# Patient Record
Sex: MALE | Race: BLACK OR AFRICAN AMERICAN | Employment: FULL TIME | URBAN - METROPOLITAN AREA
[De-identification: names, ages, dates, MRNs, and addresses within clinical notes are randomized per-mention and may not be internally consistent; named-entity substitution may affect disease eponyms.]

---

## 2017-06-05 ENCOUNTER — OFFICE VISIT (OUTPATIENT)
Dept: ORTHOPEDIC SURGERY | Age: 52
End: 2017-06-05

## 2017-06-05 VITALS — BODY MASS INDEX: 24.52 KG/M2 | WEIGHT: 185 LBS | HEIGHT: 73 IN

## 2017-06-05 DIAGNOSIS — M17.11 PRIMARY OSTEOARTHRITIS OF RIGHT KNEE: Primary | ICD-10-CM

## 2017-06-05 DIAGNOSIS — M25.561 CHRONIC PAIN OF RIGHT KNEE: ICD-10-CM

## 2017-06-05 DIAGNOSIS — G89.29 CHRONIC PAIN OF RIGHT KNEE: ICD-10-CM

## 2017-06-05 PROCEDURE — 20610 DRAIN/INJ JOINT/BURSA W/O US: CPT | Performed by: ORTHOPAEDIC SURGERY

## 2017-06-05 PROCEDURE — 99243 OFF/OP CNSLTJ NEW/EST LOW 30: CPT | Performed by: ORTHOPAEDIC SURGERY

## 2017-06-05 RX ORDER — MELOXICAM 15 MG/1
15 TABLET ORAL DAILY
Qty: 30 TABLET | Refills: 1 | Status: SHIPPED | OUTPATIENT
Start: 2017-06-05 | End: 2017-07-17

## 2017-07-17 ENCOUNTER — OFFICE VISIT (OUTPATIENT)
Dept: INTERNAL MEDICINE CLINIC | Age: 52
End: 2017-07-17

## 2017-07-17 ENCOUNTER — OFFICE VISIT (OUTPATIENT)
Dept: ORTHOPEDIC SURGERY | Age: 52
End: 2017-07-17

## 2017-07-17 VITALS
HEART RATE: 66 BPM | RESPIRATION RATE: 16 BRPM | WEIGHT: 188.4 LBS | OXYGEN SATURATION: 98 % | TEMPERATURE: 99 F | DIASTOLIC BLOOD PRESSURE: 100 MMHG | SYSTOLIC BLOOD PRESSURE: 159 MMHG | HEIGHT: 72 IN | BODY MASS INDEX: 25.52 KG/M2

## 2017-07-17 VITALS
BODY MASS INDEX: 25.05 KG/M2 | SYSTOLIC BLOOD PRESSURE: 145 MMHG | HEART RATE: 68 BPM | HEIGHT: 73 IN | DIASTOLIC BLOOD PRESSURE: 99 MMHG | WEIGHT: 189 LBS

## 2017-07-17 DIAGNOSIS — M17.11 PRIMARY OSTEOARTHRITIS OF RIGHT KNEE: Primary | ICD-10-CM

## 2017-07-17 DIAGNOSIS — R03.0 ELEVATED BLOOD PRESSURE READING: ICD-10-CM

## 2017-07-17 DIAGNOSIS — Z13.0 SCREENING FOR DEFICIENCY ANEMIA: ICD-10-CM

## 2017-07-17 DIAGNOSIS — H53.9 VISION CHANGES: Primary | ICD-10-CM

## 2017-07-17 DIAGNOSIS — Z76.89 ENCOUNTER TO ESTABLISH CARE: ICD-10-CM

## 2017-07-17 DIAGNOSIS — Z11.4 SCREENING FOR HIV (HUMAN IMMUNODEFICIENCY VIRUS): ICD-10-CM

## 2017-07-17 DIAGNOSIS — Z13.220 SCREENING FOR HYPERLIPIDEMIA: ICD-10-CM

## 2017-07-17 DIAGNOSIS — M17.11 PRIMARY OSTEOARTHRITIS OF RIGHT KNEE: ICD-10-CM

## 2017-07-17 DIAGNOSIS — Z13.1 SCREENING FOR DIABETES MELLITUS (DM): ICD-10-CM

## 2017-07-17 DIAGNOSIS — R56.9 SEIZURE (HCC): ICD-10-CM

## 2017-07-17 LAB
A/G RATIO: 1.1 (ref 1.1–2.2)
ALBUMIN SERPL-MCNC: 4.8 G/DL (ref 3.4–5)
ALP BLD-CCNC: 38 U/L (ref 40–129)
ALT SERPL-CCNC: 78 U/L (ref 10–40)
ANION GAP SERPL CALCULATED.3IONS-SCNC: 15 MMOL/L (ref 3–16)
AST SERPL-CCNC: 85 U/L (ref 15–37)
BASOPHILS ABSOLUTE: 0 K/UL (ref 0–0.2)
BASOPHILS RELATIVE PERCENT: 0.2 %
BILIRUB SERPL-MCNC: 0.3 MG/DL (ref 0–1)
BUN BLDV-MCNC: 12 MG/DL (ref 7–20)
CALCIUM SERPL-MCNC: 9.9 MG/DL (ref 8.3–10.6)
CHLORIDE BLD-SCNC: 101 MMOL/L (ref 99–110)
CHOLESTEROL, FASTING: 201 MG/DL (ref 0–199)
CO2: 24 MMOL/L (ref 21–32)
CREAT SERPL-MCNC: 0.7 MG/DL (ref 0.9–1.3)
EOSINOPHILS ABSOLUTE: 0 K/UL (ref 0–0.6)
EOSINOPHILS RELATIVE PERCENT: 0.5 %
GFR AFRICAN AMERICAN: >60
GFR NON-AFRICAN AMERICAN: >60
GLOBULIN: 4.4 G/DL
GLUCOSE BLD-MCNC: 90 MG/DL (ref 70–99)
HCT VFR BLD CALC: 41.5 % (ref 40.5–52.5)
HDLC SERPL-MCNC: 69 MG/DL (ref 40–60)
HEMOGLOBIN: 13.5 G/DL (ref 13.5–17.5)
LDL CHOLESTEROL CALCULATED: 114 MG/DL
LYMPHOCYTES ABSOLUTE: 0.7 K/UL (ref 1–5.1)
LYMPHOCYTES RELATIVE PERCENT: 15 %
MCH RBC QN AUTO: 31.4 PG (ref 26–34)
MCHC RBC AUTO-ENTMCNC: 32.5 G/DL (ref 31–36)
MCV RBC AUTO: 96.5 FL (ref 80–100)
MONOCYTES ABSOLUTE: 0.8 K/UL (ref 0–1.3)
MONOCYTES RELATIVE PERCENT: 16.1 %
NEUTROPHILS ABSOLUTE: 3.3 K/UL (ref 1.7–7.7)
NEUTROPHILS RELATIVE PERCENT: 68.2 %
PDW BLD-RTO: 14.3 % (ref 12.4–15.4)
PLATELET # BLD: 102 K/UL (ref 135–450)
PMV BLD AUTO: 8.7 FL (ref 5–10.5)
POTASSIUM SERPL-SCNC: 4.9 MMOL/L (ref 3.5–5.1)
RBC # BLD: 4.3 M/UL (ref 4.2–5.9)
SODIUM BLD-SCNC: 140 MMOL/L (ref 136–145)
TOTAL PROTEIN: 9.2 G/DL (ref 6.4–8.2)
TRIGLYCERIDE, FASTING: 89 MG/DL (ref 0–150)
VLDLC SERPL CALC-MCNC: 18 MG/DL
WBC # BLD: 4.8 K/UL (ref 4–11)

## 2017-07-17 PROCEDURE — 99213 OFFICE O/P EST LOW 20 MIN: CPT | Performed by: ORTHOPAEDIC SURGERY

## 2017-07-17 PROCEDURE — 99204 OFFICE O/P NEW MOD 45 MIN: CPT | Performed by: NURSE PRACTITIONER

## 2017-07-17 RX ORDER — LEVETIRACETAM 1000 MG/1
1000 TABLET ORAL 2 TIMES DAILY
COMMUNITY
Start: 2017-04-21

## 2017-07-17 RX ORDER — IBUPROFEN 600 MG/1
600 TABLET ORAL EVERY 8 HOURS PRN
Qty: 60 TABLET | Refills: 1 | Status: SHIPPED | OUTPATIENT
Start: 2017-07-17

## 2017-07-17 RX ORDER — MELOXICAM 15 MG/1
15 TABLET ORAL DAILY
Qty: 30 TABLET | Refills: 1 | Status: SHIPPED | OUTPATIENT
Start: 2017-07-17

## 2017-07-17 RX ORDER — LORATADINE 10 MG/1
10 TABLET ORAL DAILY
COMMUNITY

## 2017-07-17 RX ORDER — IBUPROFEN 600 MG/1
600 TABLET ORAL EVERY 8 HOURS PRN
COMMUNITY
Start: 2016-10-23 | End: 2017-07-17 | Stop reason: SDUPTHER

## 2017-07-17 RX ORDER — LEVETIRACETAM 500 MG/1
1000 TABLET ORAL 2 TIMES DAILY
Qty: 180 TABLET | Refills: 3 | Status: SHIPPED | OUTPATIENT
Start: 2017-07-17

## 2017-07-17 ASSESSMENT — PATIENT HEALTH QUESTIONNAIRE - PHQ9
1. LITTLE INTEREST OR PLEASURE IN DOING THINGS: 0
SUM OF ALL RESPONSES TO PHQ9 QUESTIONS 1 & 2: 0
2. FEELING DOWN, DEPRESSED OR HOPELESS: 0
SUM OF ALL RESPONSES TO PHQ QUESTIONS 1-9: 0

## 2017-07-17 ASSESSMENT — ENCOUNTER SYMPTOMS
CONSTIPATION: 0
VOMITING: 0
RHINORRHEA: 0
NAUSEA: 0
COUGH: 0
ABDOMINAL DISTENTION: 0
DIARRHEA: 0

## 2017-07-18 LAB — HIV-1 AND HIV-2 ANTIBODIES: NORMAL

## 2017-07-26 ENCOUNTER — TELEPHONE (OUTPATIENT)
Dept: ORTHOPEDIC SURGERY | Age: 52
End: 2017-07-26

## 2017-08-02 ENCOUNTER — OFFICE VISIT (OUTPATIENT)
Dept: ORTHOPEDIC SURGERY | Age: 52
End: 2017-08-02

## 2017-08-02 VITALS — HEIGHT: 73 IN | BODY MASS INDEX: 25.05 KG/M2 | WEIGHT: 189 LBS

## 2017-08-02 DIAGNOSIS — M17.11 PRIMARY OSTEOARTHRITIS OF RIGHT KNEE: Primary | ICD-10-CM

## 2017-08-02 PROCEDURE — 20610 DRAIN/INJ JOINT/BURSA W/O US: CPT | Performed by: ORTHOPAEDIC SURGERY

## 2017-08-09 ENCOUNTER — OFFICE VISIT (OUTPATIENT)
Dept: ORTHOPEDIC SURGERY | Age: 52
End: 2017-08-09

## 2017-08-09 VITALS — WEIGHT: 189 LBS | BODY MASS INDEX: 25.05 KG/M2 | RESPIRATION RATE: 12 BRPM | HEIGHT: 73 IN

## 2017-08-09 DIAGNOSIS — M17.11 PRIMARY OSTEOARTHRITIS OF RIGHT KNEE: Primary | ICD-10-CM

## 2017-08-09 PROCEDURE — 20610 DRAIN/INJ JOINT/BURSA W/O US: CPT | Performed by: ORTHOPAEDIC SURGERY

## 2017-08-16 ENCOUNTER — OFFICE VISIT (OUTPATIENT)
Dept: ORTHOPEDIC SURGERY | Age: 52
End: 2017-08-16

## 2017-08-16 VITALS — BODY MASS INDEX: 25.05 KG/M2 | WEIGHT: 189 LBS | HEIGHT: 73 IN

## 2017-08-16 DIAGNOSIS — M17.11 PRIMARY OSTEOARTHRITIS OF RIGHT KNEE: Primary | ICD-10-CM

## 2017-08-16 PROCEDURE — 20610 DRAIN/INJ JOINT/BURSA W/O US: CPT | Performed by: ORTHOPAEDIC SURGERY

## 2017-09-01 ENCOUNTER — OFFICE VISIT (OUTPATIENT)
Dept: INTERNAL MEDICINE CLINIC | Age: 52
End: 2017-09-01

## 2017-09-01 VITALS
DIASTOLIC BLOOD PRESSURE: 85 MMHG | RESPIRATION RATE: 16 BRPM | SYSTOLIC BLOOD PRESSURE: 138 MMHG | BODY MASS INDEX: 25.84 KG/M2 | HEIGHT: 73 IN | HEART RATE: 70 BPM | OXYGEN SATURATION: 98 % | WEIGHT: 195 LBS

## 2017-09-01 DIAGNOSIS — Z23 NEED FOR PROPHYLACTIC VACCINATION AGAINST DIPHTHERIA-TETANUS-PERTUSSIS (DTP): ICD-10-CM

## 2017-09-01 DIAGNOSIS — D3A.8 BENIGN NEUROENDOCRINE TUMOR OF RECTUM: ICD-10-CM

## 2017-09-01 DIAGNOSIS — Z12.11 SCREENING FOR COLON CANCER: ICD-10-CM

## 2017-09-01 DIAGNOSIS — R56.9 SEIZURE (HCC): Primary | ICD-10-CM

## 2017-09-01 PROBLEM — F10.10 ALCOHOL ABUSE: Status: ACTIVE | Noted: 2017-09-01

## 2017-09-01 PROBLEM — Z91.09 ENVIRONMENTAL ALLERGIES: Status: ACTIVE | Noted: 2017-09-01

## 2017-09-01 PROCEDURE — 90715 TDAP VACCINE 7 YRS/> IM: CPT | Performed by: NURSE PRACTITIONER

## 2017-09-01 PROCEDURE — 99214 OFFICE O/P EST MOD 30 MIN: CPT | Performed by: NURSE PRACTITIONER

## 2017-09-01 PROCEDURE — 90471 IMMUNIZATION ADMIN: CPT | Performed by: NURSE PRACTITIONER

## 2017-09-01 ASSESSMENT — ENCOUNTER SYMPTOMS
RHINORRHEA: 0
COUGH: 0
CONSTIPATION: 0
DIARRHEA: 0
NAUSEA: 0

## 2017-10-04 ENCOUNTER — OFFICE VISIT (OUTPATIENT)
Dept: ORTHOPEDIC SURGERY | Age: 52
End: 2017-10-04

## 2017-10-04 VITALS
BODY MASS INDEX: 25.05 KG/M2 | HEART RATE: 80 BPM | SYSTOLIC BLOOD PRESSURE: 112 MMHG | HEIGHT: 73 IN | WEIGHT: 189 LBS | DIASTOLIC BLOOD PRESSURE: 70 MMHG

## 2017-10-04 DIAGNOSIS — Z01.818 PRE-OP TESTING: ICD-10-CM

## 2017-10-04 DIAGNOSIS — M17.11 PRIMARY OSTEOARTHRITIS OF RIGHT KNEE: Primary | ICD-10-CM

## 2017-10-04 PROCEDURE — 99214 OFFICE O/P EST MOD 30 MIN: CPT | Performed by: ORTHOPAEDIC SURGERY

## 2017-10-04 NOTE — MR AVS SNAPSHOT
After Visit Summary             Fili Mediate   10/4/2017 9:15 AM   Office Visit    Description:  Male : 1965   Provider:  Rohith Tan MD   Department:  [address] [facility] Sp[facility]              Your Follow-Up and Future Appointments         Below is a list of your follow-up and future appointments. This may not be a complete list as you may have made appointments directly with providers that we are not aware of or your providers may have made some for you. Please call your providers to confirm appointments. It is important to keep your appointments. Please bring your current insurance card, photo ID, co-pay, and all medication bottles to your appointment. If self-pay, payment is expected at the time of service. Your To-Do List     Future Appointments Provider Department Dept Phone    2018 9:00 AM Rohith Tan MD [facility] [phone]    3/2/2018 9:00 [facility] [address] [place] [address] Internal Medicine and Pediatrics [phone]    Please arrive 15 minutes prior to appointment, bring photo ID and insurance card.     Future Orders Complete By Expires    CBC WITH AUTO DIFFERENTIAL [[id number] Custom]  10/4/2017 10/4/2018         Information from Your Visit        Department     Name Address Phone Fax    [address] [facility] Spine [address] [facility]) [address] [place] [address] 38. [phone]      You Were Seen for:         Comments    Primary osteoarthritis of right knee   [838472]         Vital Signs     Blood Pressure Pulse Height Weight Body Mass Index Smoking Status    112/70 (Site: Left Arm, Position: Sitting, Cuff Size: Medium Adult) 80 6' 1\" (1.854 m) 189 lb (85.7 kg) 24.94 kg/m2 Never Smoker         Medications and Orders      Your Current Medications Are              meloxicam (MOBIC) 15 MG tablet Take 1 tablet by mouth daily 3. Enter your Mlog Access Code exactly as it appears below. You will not need to use this code after youve completed the sign-up process. If you do not sign up before the expiration date, you must request a new code. Mlog Access Code: [email or web address]  Expires: 12/3/2017 10:58 AM    4. Enter your Social Security Number ([id number]) and Date of Birth (mm/dd/yyyy) as indicated and click Submit. You will be taken to the next sign-up page. 5. Create a Mlog ID. This will be your Mlog login ID and cannot be changed, so think of one that is secure and easy to remember. 6. Create a Mlog password. You can change your password at any time. 7. Enter your Password Reset Question and Answer. This can be used at a later time if you forget your password. 8. Enter your e-mail address. You will receive e-mail notification when new information is available in [address] 9. Click Sign Up. You can now view your medical record. Additional Information  If you have questions, please contact the physician practice where you receive care. Remember, Mlog is NOT to be used for urgent needs. For medical emergencies, dial 911. For questions regarding your Mlog account call [phone]. If you have a clinical question, please call your doctor's office.

## 2017-10-04 NOTE — PROGRESS NOTES
brisk cap refill. Patient is able to dorsiflex and wiggle all toes. Deep tendon reflexes of the lower extremities are normal and symmetric. Imagin views of the right knee obtained in the office today were extensively reviewed. There is evidence of severe osteoarthritis of the right knee with bone-on-bone changes within the medial compartment. Impression: #1 right knee osteoarthritis    Plan: At this time, the patient will be scheduled for a right total knee arthroplasty. All risks including but not limited to blood loss, infection, persistent pain,stiffness, weakness,loosening,deep vein thrombosis,neurovascular injury, and the risks of anesthesia were discussed. The patient understands all risks and benefits of the procedure and agrees to proceed. The patient will see his primary care physician,Ernestina Saldana CNP, for medical clearance. If the patient is on NSAIDS or blood thinners these medications will need to be discontinued one week prior to surgery.

## 2017-10-04 NOTE — LETTER
TOTAL KNEE REPLACEMENT PHYSICIANS ORDERS   I hereby authorize the pharmacy, under the formulary system to dispense a different brand of drug identical composition and comparable quality unless the brand name I have prescribed is circled on this order sheet  All orders without checkboxes will be processed automatically unless crossed out. Orders with checkboxes MUST be checked in order to be carried out. PRE-OP Celsus.Romeo  [ ] X-ray operative site-standing view  Godinez.Dina ] CBC without differential [X] Albumin  Godinez.Dina ] BMP      [ ] Coag profile  [X] PT/INR   [ ] Sed rate on revisions of total joints only Godinez.Dina ] Type and screen  Godinez.Dina ] EKG      Godinez.Dina ] UAR     [X] A1C  Godinez.Dina ] Clean catch urine for routine analysis, if positive for nitrites and/or leukocytes, do urine for C & S  Godinez.Dina ] Nasal culture for MRSA  Godinez.Dina ] Physical therapy evaluation and teaching  Godinez.Dina ] Occupational therapy evaluation and teaching  Godinez.Dina ] Inform patient to stop all anti-inflammatory medications including aspirin for 7 days before surgery    DAY OF SURGERY  Godinez.Dina ] Cefazolin: 1 gram IVPB; if patient weighs > 80 kg and serum creatinine <2.5 mg/dl give 2 gram dose within 1 hour of incision. If patient has a minor allergy to Penicillin, still give this. OR  If the pre-op nasal culture for MRSA was positive, repeat nasal swab before surgery and give: Vancomycin 1 gram IVPB, reduce dose of Vancomycin to 500 mg IVPB if PT < 55 kg or serum creatinine > 2 mg/dl (Vancomycin must be administered over 1 hour)& Cefazolin 1 gram IVPB; if patient weighs>80 kg and serum creatinine <2.3 mg/dl give 2 gram dose within 1 hour of incision  OR  If patient has a true severe allergy to Penicillin or Cephalosporins give: Clindamycin 900mg IVPB, then administer 2 more doses post op.     Godinez.Dina ] Apply knee high antiembolic hose and pneumonboots to unoperative leg    Other order: Celebrex 400mg pre-op    T.O: _____________________________/___________________Date:_______Time:_______ Physician    RN    Physician Signature:               Date/Time:  10/30/2017 2:03 PM

## 2018-01-09 ENCOUNTER — PAT TELEPHONE (OUTPATIENT)
Dept: PREADMISSION TESTING | Age: 53
End: 2018-01-09

## 2018-01-09 DIAGNOSIS — Z01.818 ENCOUNTER FOR PREADMISSION TESTING: Primary | ICD-10-CM

## 2018-01-10 ENCOUNTER — HOSPITAL ENCOUNTER (OUTPATIENT)
Dept: PREADMISSION TESTING | Age: 53
Discharge: HOME | End: 2018-01-10
Attending: ORTHOPAEDIC SURGERY | Admitting: ORTHOPAEDIC SURGERY

## 2018-01-10 DIAGNOSIS — Z01.818 ENCOUNTER FOR PREADMISSION TESTING: ICD-10-CM

## 2023-07-03 ENCOUNTER — APPOINTMENT (OUTPATIENT)
Dept: GENERAL RADIOLOGY | Age: 58
End: 2023-07-03

## 2023-07-03 ENCOUNTER — HOSPITAL ENCOUNTER (EMERGENCY)
Age: 58
Discharge: HOME | End: 2023-07-04

## 2023-07-03 VITALS
OXYGEN SATURATION: 94 % | BODY MASS INDEX: 25.05 KG/M2 | WEIGHT: 189 LBS | HEART RATE: 71 BPM | TEMPERATURE: 98.2 F | RESPIRATION RATE: 19 BRPM | SYSTOLIC BLOOD PRESSURE: 110 MMHG | DIASTOLIC BLOOD PRESSURE: 72 MMHG | HEIGHT: 73 IN

## 2023-07-03 DIAGNOSIS — R56.9 OBSERVED SEIZURE-LIKE ACTIVITY (HCC): Primary | ICD-10-CM

## 2023-07-03 DIAGNOSIS — F10.920 ACUTE ALCOHOLIC INTOXICATION WITHOUT COMPLICATION (HCC): ICD-10-CM

## 2023-07-03 LAB
ALBUMIN SERPL-MCNC: 4.1 G/DL (ref 3.4–5)
ALBUMIN/GLOB SERPL: 1.2 {RATIO} (ref 1.1–2.2)
ALP SERPL-CCNC: 52 U/L (ref 40–129)
ALT SERPL-CCNC: 23 U/L (ref 10–40)
ANION GAP SERPL CALCULATED.3IONS-SCNC: 13 MMOL/L (ref 3–16)
AST SERPL-CCNC: 35 U/L (ref 15–37)
BASOPHILS # BLD: 0 K/UL (ref 0–0.2)
BASOPHILS NFR BLD: 0.7 %
BILIRUB SERPL-MCNC: <0.2 MG/DL (ref 0–1)
BUN SERPL-MCNC: 13 MG/DL (ref 7–20)
CALCIUM SERPL-MCNC: 9.1 MG/DL (ref 8.3–10.6)
CHLORIDE SERPL-SCNC: 98 MMOL/L (ref 99–110)
CO2 SERPL-SCNC: 19 MMOL/L (ref 21–32)
CREAT SERPL-MCNC: 1.3 MG/DL (ref 0.9–1.3)
DEPRECATED RDW RBC AUTO: 14.6 % (ref 12.4–15.4)
EOSINOPHIL # BLD: 0 K/UL (ref 0–0.6)
EOSINOPHIL NFR BLD: 0.8 %
ETHANOLAMINE SERPL-MCNC: 258 MG/DL (ref 0–0.08)
GFR SERPLBLD CREATININE-BSD FMLA CKD-EPI: >60 ML/MIN/{1.73_M2}
GLUCOSE BLD-MCNC: 118 MG/DL (ref 70–99)
GLUCOSE SERPL-MCNC: 114 MG/DL (ref 70–99)
HCT VFR BLD AUTO: 35.5 % (ref 40.5–52.5)
HGB BLD-MCNC: 11.6 G/DL (ref 13.5–17.5)
LACTATE BLDV-SCNC: 3.4 MMOL/L (ref 0.4–2)
LYMPHOCYTES # BLD: 0.9 K/UL (ref 1–5.1)
LYMPHOCYTES NFR BLD: 24.2 %
MCH RBC QN AUTO: 30.6 PG (ref 26–34)
MCHC RBC AUTO-ENTMCNC: 32.8 G/DL (ref 31–36)
MCV RBC AUTO: 93.3 FL (ref 80–100)
MONOCYTES # BLD: 0.4 K/UL (ref 0–1.3)
MONOCYTES NFR BLD: 10.5 %
NEUTROPHILS # BLD: 2.3 K/UL (ref 1.7–7.7)
NEUTROPHILS NFR BLD: 63.8 %
PERFORMED ON: ABNORMAL
PLATELET # BLD AUTO: 177 K/UL (ref 135–450)
PMV BLD AUTO: 7.7 FL (ref 5–10.5)
POTASSIUM SERPL-SCNC: 4.2 MMOL/L (ref 3.5–5.1)
PROT SERPL-MCNC: 7.6 G/DL (ref 6.4–8.2)
RBC # BLD AUTO: 3.8 M/UL (ref 4.2–5.9)
SODIUM SERPL-SCNC: 130 MMOL/L (ref 136–145)
TROPONIN, HIGH SENSITIVITY: <6 NG/L (ref 0–22)
WBC # BLD AUTO: 3.7 K/UL (ref 4–11)

## 2023-07-03 PROCEDURE — 93005 ELECTROCARDIOGRAM TRACING: CPT | Performed by: EMERGENCY MEDICINE

## 2023-07-03 PROCEDURE — 82077 ASSAY SPEC XCP UR&BREATH IA: CPT

## 2023-07-03 PROCEDURE — 80053 COMPREHEN METABOLIC PANEL: CPT

## 2023-07-03 PROCEDURE — 83605 ASSAY OF LACTIC ACID: CPT

## 2023-07-03 PROCEDURE — 84484 ASSAY OF TROPONIN QUANT: CPT

## 2023-07-03 PROCEDURE — 2580000003 HC RX 258: Performed by: NURSE PRACTITIONER

## 2023-07-03 PROCEDURE — 96360 HYDRATION IV INFUSION INIT: CPT

## 2023-07-03 PROCEDURE — 85025 COMPLETE CBC W/AUTO DIFF WBC: CPT

## 2023-07-03 PROCEDURE — 99284 EMERGENCY DEPT VISIT MOD MDM: CPT

## 2023-07-03 PROCEDURE — 71045 X-RAY EXAM CHEST 1 VIEW: CPT

## 2023-07-03 RX ORDER — 0.9 % SODIUM CHLORIDE 0.9 %
1000 INTRAVENOUS SOLUTION INTRAVENOUS ONCE
Status: COMPLETED | OUTPATIENT
Start: 2023-07-03 | End: 2023-07-03

## 2023-07-03 RX ORDER — AMMONIA INHALANTS 0.04 G/.3ML
0.3 INHALANT RESPIRATORY (INHALATION) ONCE
Status: DISCONTINUED | OUTPATIENT
Start: 2023-07-03 | End: 2023-07-03

## 2023-07-03 RX ADMIN — SODIUM CHLORIDE 1000 ML: 9 INJECTION, SOLUTION INTRAVENOUS at 22:23

## 2023-07-03 ASSESSMENT — PAIN - FUNCTIONAL ASSESSMENT: PAIN_FUNCTIONAL_ASSESSMENT: NONE - DENIES PAIN

## 2023-07-04 LAB
EKG ATRIAL RATE: 78 BPM
EKG DIAGNOSIS: NORMAL
EKG P AXIS: 57 DEGREES
EKG P-R INTERVAL: 188 MS
EKG Q-T INTERVAL: 384 MS
EKG QRS DURATION: 96 MS
EKG QTC CALCULATION (BAZETT): 437 MS
EKG R AXIS: 22 DEGREES
EKG T AXIS: 54 DEGREES
EKG VENTRICULAR RATE: 78 BPM

## 2023-07-04 PROCEDURE — 93010 ELECTROCARDIOGRAM REPORT: CPT | Performed by: INTERNAL MEDICINE

## 2023-07-05 NOTE — ED PROVIDER NOTES
I was asked for an EKG interpretation. Otherwise, I did not evaluate the patient. I was available for consultation. EKG  The Ekg interpreted by me in the absence of a cardiologist shows. normal sinus rhythm with a rate of 78  Axis is   Normal  QTc is  normal  Intervals and Durations are unremarkable. No specific ST-T wave changes appreciated. No evidence of acute ischemia.    No prior EKG available for comparison       Latanya Crum MD  07/04/23 0157
[address] [place] [address]  [phone]  Go to   If symptoms worsen      DISCHARGE MEDICATIONS:  Discharge Medication List as of 7/3/2023 11:47 PM          DISCONTINUED MEDICATIONS:  Discharge Medication List as of 7/3/2023 11:47 PM                 (Please note that portions of this note were completed with a voice recognition program.  Efforts were made to edit the dictations but occasionally words are mis-transcribed.)    GALINDO Reed CNP (electronically signed)      GALINDO Reed CNP  07/05/23 1930